# Patient Record
Sex: FEMALE | Race: WHITE | NOT HISPANIC OR LATINO
[De-identification: names, ages, dates, MRNs, and addresses within clinical notes are randomized per-mention and may not be internally consistent; named-entity substitution may affect disease eponyms.]

---

## 2023-08-14 PROBLEM — Z00.00 ENCOUNTER FOR PREVENTIVE HEALTH EXAMINATION: Status: ACTIVE | Noted: 2023-08-14

## 2023-08-18 NOTE — PHYSICAL EXAM
[Normocephalic] : normocephalic [EOMI] : extra ocular movement intact [Supple] : supple [No Supraclavicular Adenopathy] : no supraclavicular adenopathy [No Cervical Adenopathy] : no cervical adenopathy [de-identified] : Bilateral breast/axilla/supraclavicular area: No masses, discharge, or adenopathy

## 2023-08-18 NOTE — PLAN
[TextEntry] : Tentative Plan: Reviewed imaging findings and pathology discussed results with patient. Discussed diagnosis of XX as an invasive breast cancer and need for surgical excision. Discussed adjuvant treatments of chemotherapy, radiation therapy, and antihormonal therapy. Discussed surgical options of XX nloc lumpx & SNLB vs TM. Discussed the indication for additional surgical excision depending upon the final surgical pathology. Discussed the benefits and the risks of the surgery not limited to anesthesia risks, bleeding, skin breakdown, infection, and numbness of the skin. Patient is to get MRI to evaluate extent of disease. Genetics?? Slide review to be sent (?). Imaging review (?). Patient will need medical clearance prior to surgery (hx of ?). Plan for XX nloc lumpectomy & SNLB.

## 2023-08-18 NOTE — FAMILY HISTORY
[TextEntry] : Additional images (including further diagnostic images) are approved if indicated by radiologist.

## 2023-08-28 ENCOUNTER — NON-APPOINTMENT (OUTPATIENT)
Age: 73
End: 2023-08-28

## 2023-08-30 ENCOUNTER — NON-APPOINTMENT (OUTPATIENT)
Age: 73
End: 2023-08-30

## 2023-08-30 ENCOUNTER — APPOINTMENT (OUTPATIENT)
Dept: BREAST CENTER | Facility: CLINIC | Age: 73
End: 2023-08-30
Payer: MEDICARE

## 2023-08-30 VITALS
DIASTOLIC BLOOD PRESSURE: 69 MMHG | HEART RATE: 77 BPM | WEIGHT: 111 LBS | SYSTOLIC BLOOD PRESSURE: 123 MMHG | HEIGHT: 65 IN | BODY MASS INDEX: 18.49 KG/M2

## 2023-08-30 DIAGNOSIS — Z78.9 OTHER SPECIFIED HEALTH STATUS: ICD-10-CM

## 2023-08-30 DIAGNOSIS — C50.912 MALIGNANT NEOPLASM OF UNSPECIFIED SITE OF LEFT FEMALE BREAST: ICD-10-CM

## 2023-08-30 DIAGNOSIS — J45.20 MILD INTERMITTENT ASTHMA, UNCOMPLICATED: ICD-10-CM

## 2023-08-30 DIAGNOSIS — Z80.8 FAMILY HISTORY OF MALIGNANT NEOPLASM OF OTHER ORGANS OR SYSTEMS: ICD-10-CM

## 2023-08-30 DIAGNOSIS — N64.59 OTHER SIGNS AND SYMPTOMS IN BREAST: ICD-10-CM

## 2023-08-30 PROCEDURE — 99205 OFFICE O/P NEW HI 60 MIN: CPT

## 2023-08-30 RX ORDER — ALBUTEROL 90 MCG
90 AEROSOL (GRAM) INHALATION
Refills: 0 | Status: ACTIVE | COMMUNITY

## 2023-08-30 RX ORDER — MONTELUKAST 10 MG/1
TABLET, FILM COATED ORAL
Refills: 0 | Status: ACTIVE | COMMUNITY

## 2023-08-30 NOTE — PHYSICAL EXAM
[de-identified] : Bilateral breast/axilla/supraclavicular area: No masses, discharge, or adenopathy [de-identified] : R breast/axilla/supraclavicular area: No masses, discharge, or adenopathy [de-identified] : L 1cm palpable mass 3:00 periareolar and soft palpable axillary LN

## 2023-08-30 NOTE — HISTORY OF PRESENT ILLNESS
[FreeTextEntry1] : Patient is a 71yo F who presents today for initial evaluation of L US bx proven IDC (ER+/GA-/HER2+) found on annual imaging as 1cm mass (increased in size from 0.5cm in 2022). She was referred by R (Dr. Shafer). She has no prior hx of breast surgery. Denies family history of breast or ovarian cancer. Patient denies palpable masses, skin changes, or nipple discharge bilaterally.  5/16/22: B/l MG & US ([facility])- heterogeneously dense. US- R 0.8cm cyst 3:00 1FN. L 0.5cm solid hypoechoic well-circumscribed nodule 4:00 RA (rec 6m f/u). BI-RADS 3 11/17/23: L US ([facility])- L 0.5cm stable hypoechoic nodule 4:00 periareolar (rec 6m f/u at time of annual). BI-RADS 3 7/27/23: B/l MG & US ([facility])- heterogeneously dense. US- L 1cm increasing oval hypoechoic mass w/ posterior acoustic enhancement 4:00 RA (rec US bx). R 0.8cm cyst 3:00 1FN. BI-RADS 4 8/10/23: L US bx 1cm mass 4:00 RA (wing clip)- IDC (poorly differentiated w/ prominent lymphoplasmacytic infiltrate). ER+ (15%), GA- (0%), HER2+ (3+). [facility]- rec MRI

## 2023-09-01 ENCOUNTER — RESULT REVIEW (OUTPATIENT)
Age: 73
End: 2023-09-01

## 2023-09-01 ENCOUNTER — OUTPATIENT (OUTPATIENT)
Dept: OUTPATIENT SERVICES | Facility: HOSPITAL | Age: 73
LOS: 1 days | End: 2023-09-01
Payer: MEDICARE

## 2023-09-01 DIAGNOSIS — C50.912 MALIGNANT NEOPLASM OF UNSPECIFIED SITE OF LEFT FEMALE BREAST: ICD-10-CM

## 2023-09-01 LAB — SURGICAL PATHOLOGY STUDY: SIGNIFICANT CHANGE UP

## 2023-09-01 PROCEDURE — 88321 CONSLTJ&REPRT SLD PREP ELSWR: CPT

## 2023-09-06 ENCOUNTER — NON-APPOINTMENT (OUTPATIENT)
Age: 73
End: 2023-09-06

## 2023-09-07 ENCOUNTER — NON-APPOINTMENT (OUTPATIENT)
Age: 73
End: 2023-09-07

## 2023-09-07 ENCOUNTER — APPOINTMENT (OUTPATIENT)
Dept: HEMATOLOGY ONCOLOGY | Facility: CLINIC | Age: 73
End: 2023-09-07
Payer: MEDICARE

## 2023-09-07 VITALS
RESPIRATION RATE: 18 BRPM | HEIGHT: 65 IN | SYSTOLIC BLOOD PRESSURE: 100 MMHG | WEIGHT: 111 LBS | OXYGEN SATURATION: 98 % | BODY MASS INDEX: 18.49 KG/M2 | HEART RATE: 72 BPM | TEMPERATURE: 98.8 F | DIASTOLIC BLOOD PRESSURE: 66 MMHG

## 2023-09-07 DIAGNOSIS — M85.852 OTHER SPECIFIED DISORDERS OF BONE DENSITY AND STRUCTURE, LEFT THIGH: ICD-10-CM

## 2023-09-07 DIAGNOSIS — C50.912 MALIGNANT NEOPLASM OF UNSPECIFIED SITE OF LEFT FEMALE BREAST: ICD-10-CM

## 2023-09-07 DIAGNOSIS — Z87.11 PERSONAL HISTORY OF PEPTIC ULCER DISEASE: ICD-10-CM

## 2023-09-07 DIAGNOSIS — Z13.820 ENCOUNTER FOR SCREENING FOR OSTEOPOROSIS: ICD-10-CM

## 2023-09-07 DIAGNOSIS — M85.88 OTHER SPECIFIED DISORDERS OF BONE DENSITY AND STRUCTURE, OTHER SITE: ICD-10-CM

## 2023-09-07 PROCEDURE — 99204 OFFICE O/P NEW MOD 45 MIN: CPT

## 2023-09-07 RX ORDER — PANTOPRAZOLE 40 MG/1
TABLET, DELAYED RELEASE ORAL
Refills: 0 | Status: ACTIVE | COMMUNITY

## 2023-09-07 NOTE — HISTORY OF PRESENT ILLNESS
[Disease: _____________________] : Disease: [unfilled] [T: ___] : T[unfilled] [N: ___] : N[unfilled] [M: ___] : M[unfilled] [AJCC Stage: ____] : AJCC Stage: [unfilled] [de-identified] : 73yo F with a history of left IDC (ER+/WA-/HER2+) is referred by Dr. Shanon Preciado to discuss systemic therapy.  She has no prior hx of breast surgery. Denies family history of breast or ovarian cancer. Patient denies palpable masses, skin changes, or nipple discharge bilaterally.  Found on annual imaging as 1cm mass (increased in size from 0.5cm in 2022).    5/16/22: B/l MG & US ([facility])- heterogeneously dense. US- R 0.8cm cyst 3:00 1FN. L 0.5cm solid hypoechoic well-circumscribed nodule 4:00 RA (rec 6m f/u). BI-RADS 3  11/17/23: L US ([facility])- L 0.5cm stable hypoechoic nodule 4:00 periareolar (rec 6m f/u at time of annual). BI-RADS 3  7/27/23: B/l MG & US ([facility])- heterogeneously dense. US- L 1cm increasing oval hypoechoic mass w/ posterior acoustic enhancement 4:00 RA (rec US bx). R 0.8cm cyst 3:00 1FN. BI-RADS 4  8/10/23: L US bx 1cm mass 4:00 RA (wing clip)- IDC (poorly differentiated w/ prominent lymphoplasmacytic infiltrate). ER+ (15%), WA- (0%), HER2+ (3+).   9/5/23 L axilla US: The visualized lymph nodes in the left axilla appear normal with well-preserved fatty richard and non-thickened cortices.  9/5/23 MRI:  There are no abnormalities in the right breast. In the left retroareolar region, associated with a tissue marker there is a 1.1 cm enhancing mass which is the biopsy-proven malignancy. There are no additional enhancing areas in either breast. The nipples and skin appear unremarkable. There is no significant axillary or internal mammary lymphadenopathy. There are no extramammary findings.  [de-identified] : poorly differentiated invasive ductal carcinoma [de-identified] : ER+, TN-, HER2+

## 2023-09-13 ENCOUNTER — NON-APPOINTMENT (OUTPATIENT)
Age: 73
End: 2023-09-13

## 2023-10-19 ENCOUNTER — APPOINTMENT (OUTPATIENT)
Dept: HEMATOLOGY ONCOLOGY | Facility: CLINIC | Age: 73
End: 2023-10-19